# Patient Record
Sex: FEMALE | Race: ASIAN | NOT HISPANIC OR LATINO | URBAN - METROPOLITAN AREA
[De-identification: names, ages, dates, MRNs, and addresses within clinical notes are randomized per-mention and may not be internally consistent; named-entity substitution may affect disease eponyms.]

---

## 2020-11-19 ENCOUNTER — TELEPHONE ENCOUNTER (OUTPATIENT)
Dept: URBAN - METROPOLITAN AREA CLINIC 94 | Facility: CLINIC | Age: 80
End: 2020-11-19

## 2020-11-19 ENCOUNTER — WEB ENCOUNTER (OUTPATIENT)
Dept: URBAN - METROPOLITAN AREA CLINIC 94 | Facility: CLINIC | Age: 80
End: 2020-11-19

## 2020-11-19 ENCOUNTER — OFFICE VISIT (OUTPATIENT)
Dept: URBAN - METROPOLITAN AREA CLINIC 94 | Facility: CLINIC | Age: 80
End: 2020-11-19
Payer: COMMERCIAL

## 2020-11-19 VITALS
BODY MASS INDEX: 20.36 KG/M2 | DIASTOLIC BLOOD PRESSURE: 68 MMHG | WEIGHT: 97 LBS | TEMPERATURE: 98.2 F | HEIGHT: 58 IN | SYSTOLIC BLOOD PRESSURE: 95 MMHG | HEART RATE: 97 BPM

## 2020-11-19 DIAGNOSIS — K94.23 PEG TUBE MALFUNCTION: ICD-10-CM

## 2020-11-19 DIAGNOSIS — Z43.1 PEG (PERCUTANEOUS ENDOSCOPIC GASTROSTOMY) ADJUSTMENT/REPLACEMENT/REMOVAL: ICD-10-CM

## 2020-11-19 PROCEDURE — G8420 CALC BMI NORM PARAMETERS: HCPCS | Performed by: INTERNAL MEDICINE

## 2020-11-19 PROCEDURE — G9903 PT SCRN TBCO ID AS NON USER: HCPCS | Performed by: INTERNAL MEDICINE

## 2020-11-19 PROCEDURE — G8427 DOCREV CUR MEDS BY ELIG CLIN: HCPCS | Performed by: INTERNAL MEDICINE

## 2020-11-19 PROCEDURE — 99214 OFFICE O/P EST MOD 30 MIN: CPT | Performed by: INTERNAL MEDICINE

## 2020-11-19 RX ORDER — PREDNISONE 2.5 MG/1
1 MG TABLET ORAL 1
Refills: 0 | Status: ACTIVE | COMMUNITY
Start: 1900-01-01

## 2020-11-19 RX ORDER — MIRTAZAPINE 15 MG/1
TAKE 1 TABLET (15 MG) BY ORAL ROUTE ONCE DAILY BEFORE BEDTIME TABLET, FILM COATED ORAL 1
Qty: 0 | Refills: 0 | Status: ACTIVE | COMMUNITY
Start: 1900-01-01

## 2020-11-19 RX ORDER — METOPROLOL TARTRATE 75 MG/1
TAKE 1 TABLET (75 MG) BY ORAL ROUTE 2 TIMES PER DAY TABLET, FILM COATED ORAL 2
Qty: 0 | Refills: 0 | Status: ACTIVE | COMMUNITY
Start: 1900-01-01

## 2020-11-19 RX ORDER — CEPHALEXIN 250 MG/1
1 CAPSULE CAPSULE ORAL TWICE DAILY
Status: ACTIVE | COMMUNITY

## 2022-06-30 ENCOUNTER — OFFICE VISIT (OUTPATIENT)
Dept: URBAN - METROPOLITAN AREA CLINIC 94 | Facility: CLINIC | Age: 82
End: 2022-06-30
Payer: COMMERCIAL

## 2022-06-30 VITALS
WEIGHT: 110 LBS | TEMPERATURE: 97.7 F | HEIGHT: 58 IN | DIASTOLIC BLOOD PRESSURE: 52 MMHG | SYSTOLIC BLOOD PRESSURE: 88 MMHG | BODY MASS INDEX: 23.09 KG/M2 | HEART RATE: 95 BPM

## 2022-06-30 DIAGNOSIS — K59.09 CHRONIC CONSTIPATION: ICD-10-CM

## 2022-06-30 DIAGNOSIS — A49.8 CLOSTRIDIOIDES DIFFICILE INFECTION: ICD-10-CM

## 2022-06-30 PROCEDURE — 99213 OFFICE O/P EST LOW 20 MIN: CPT | Performed by: PHYSICIAN ASSISTANT

## 2022-06-30 RX ORDER — DICLOFENAC SODIUM 10 MG/G
AS DIRECTED GEL TOPICAL
Status: ACTIVE | COMMUNITY

## 2022-06-30 RX ORDER — PREDNISONE 2.5 MG/1
1 MG TABLET ORAL 1
Refills: 0 | Status: ACTIVE | COMMUNITY
Start: 1900-01-01

## 2022-06-30 RX ORDER — CEPHALEXIN 250 MG/1
1 CAPSULE CAPSULE ORAL TWICE DAILY
Status: ACTIVE | COMMUNITY

## 2022-06-30 RX ORDER — PANTOPRAZOLE SODIUM 40 MG/1
1 TABLET TABLET, DELAYED RELEASE ORAL ONCE A DAY
Status: ACTIVE | COMMUNITY

## 2022-06-30 RX ORDER — MIDODRINE HYDROCHLORIDE 2.5 MG/1
1 TABLET TABLET ORAL THREE TIMES A DAY
Status: ACTIVE | COMMUNITY

## 2022-06-30 RX ORDER — MIRTAZAPINE 15 MG/1
TAKE 1 TABLET (15 MG) BY ORAL ROUTE ONCE DAILY BEFORE BEDTIME TABLET, FILM COATED ORAL 1
Qty: 0 | Refills: 0 | Status: ACTIVE | COMMUNITY
Start: 1900-01-01

## 2022-06-30 RX ORDER — METHOTREXATE SODIUM 2.5 MG/1
AS DIRECTED TABLET ORAL
Status: ACTIVE | COMMUNITY

## 2022-06-30 RX ORDER — PREGABALIN 75 MG/1
1 CAPSULE CAPSULE ORAL TWICE A DAY
Status: ACTIVE | COMMUNITY

## 2022-06-30 RX ORDER — LEVALBUTEROL HYDROCHLORIDE 1.25 MG/3ML
3 ML AS NEEDED SOLUTION RESPIRATORY (INHALATION)
Status: ACTIVE | COMMUNITY

## 2022-06-30 RX ORDER — BUDESONIDE 0.25 MG/2ML
2 ML SUSPENSION RESPIRATORY (INHALATION) ONCE A DAY
Status: ACTIVE | COMMUNITY

## 2022-06-30 RX ORDER — HYDROXYCHLOROQUINE SULFATE 100 MG/1
AS DIRECTED TABLET ORAL
Status: ACTIVE | COMMUNITY

## 2022-06-30 RX ORDER — DILTIAZEM HYDROCHLORIDE 120 MG/1
AS DIRECTED TABLET, COATED ORAL
Status: ACTIVE | COMMUNITY

## 2022-06-30 RX ORDER — METOPROLOL TARTRATE 75 MG/1
TAKE 1 TABLET (75 MG) BY ORAL ROUTE 2 TIMES PER DAY TABLET, FILM COATED ORAL 2
Qty: 0 | Refills: 0 | Status: ACTIVE | COMMUNITY
Start: 1900-01-01

## 2022-06-30 RX ORDER — POLYETHYLENE GLYCOL 3350 17 G/17G
AS DIRECTED POWDER, FOR SOLUTION ORAL
Status: ACTIVE | COMMUNITY

## 2022-06-30 RX ORDER — TRAMADOL HYDROCHLORIDE 50 MG/1
1 TABLET AS NEEDED TABLET, FILM COATED ORAL ONCE A DAY
Status: ACTIVE | COMMUNITY

## 2022-06-30 RX ORDER — BACITRACIN ZINC, NEOMYCIN SULFATE, POLYMYXIN B SULFATE 500; 3.5; 1 [IU]/G; MG/G; [IU]/G
2 TABLETS AT BEDTIME AS NEEDED OINTMENT TOPICAL ONCE A DAY
Status: ACTIVE | COMMUNITY

## 2022-06-30 NOTE — HPI-TODAY'S VISIT:
82 y.o. female evaluated today for hx of recurrent C Diff. Pt accompanied by her daughter.   Pt recently hospitalized April 2022 for C Diff. Pt has unusual presentation of C Diff with constipation and lethargy. Recent hospital admission, ID consulted C diff pcr positive with negative toxin. Given clinical symptoms and immunosuppression ID recommended continued treatment with oral vancomycin.   CT Ventral abdominal wall hernia. Hepatic steatosis.  Left lower lobe consolidation with left pleural effusion.  Pt denies seeing ID since hospital discharge. Pt's daughter reports that patient has had C diff 3 times since last yr. Pt recently has developed similar sx when she had C Diff in April. She reports being constipated with hard stools. PT also seems very weak. She denies fever, diarrhea, melena or hematochezia. She does admit to lower abdominal cramping.

## 2022-07-04 ENCOUNTER — WEB ENCOUNTER (OUTPATIENT)
Dept: URBAN - METROPOLITAN AREA CLINIC 94 | Facility: CLINIC | Age: 82
End: 2022-07-04

## 2022-07-05 ENCOUNTER — WEB ENCOUNTER (OUTPATIENT)
Dept: URBAN - METROPOLITAN AREA CLINIC 94 | Facility: CLINIC | Age: 82
End: 2022-07-05

## 2022-08-22 ENCOUNTER — OFFICE VISIT (OUTPATIENT)
Dept: URBAN - METROPOLITAN AREA CLINIC 94 | Facility: CLINIC | Age: 82
End: 2022-08-22

## 2022-10-02 ENCOUNTER — WEB ENCOUNTER (OUTPATIENT)
Dept: URBAN - METROPOLITAN AREA CLINIC 94 | Facility: CLINIC | Age: 82
End: 2022-10-02

## 2022-10-04 ENCOUNTER — OFFICE VISIT (OUTPATIENT)
Dept: URBAN - METROPOLITAN AREA CLINIC 94 | Facility: CLINIC | Age: 82
End: 2022-10-04

## 2022-10-04 RX ORDER — BUDESONIDE 0.25 MG/2ML
2 ML SUSPENSION RESPIRATORY (INHALATION) ONCE A DAY
Status: ACTIVE | COMMUNITY

## 2022-10-04 RX ORDER — LEVALBUTEROL HYDROCHLORIDE 1.25 MG/3ML
3 ML AS NEEDED SOLUTION RESPIRATORY (INHALATION)
Status: ACTIVE | COMMUNITY

## 2022-10-04 RX ORDER — METOPROLOL TARTRATE 75 MG/1
TAKE 1 TABLET (75 MG) BY ORAL ROUTE 2 TIMES PER DAY TABLET, FILM COATED ORAL 2
Qty: 0 | Refills: 0 | Status: ACTIVE | COMMUNITY
Start: 1900-01-01

## 2022-10-04 RX ORDER — BACITRACIN ZINC, NEOMYCIN SULFATE, POLYMYXIN B SULFATE 500; 3.5; 1 [IU]/G; MG/G; [IU]/G
2 TABLETS AT BEDTIME AS NEEDED OINTMENT TOPICAL ONCE A DAY
Status: ACTIVE | COMMUNITY

## 2022-10-04 RX ORDER — MIRTAZAPINE 15 MG/1
TAKE 1 TABLET (15 MG) BY ORAL ROUTE ONCE DAILY BEFORE BEDTIME TABLET, FILM COATED ORAL 1
Qty: 0 | Refills: 0 | Status: ACTIVE | COMMUNITY
Start: 1900-01-01

## 2022-10-04 RX ORDER — POLYETHYLENE GLYCOL 3350 17 G/17G
AS DIRECTED POWDER, FOR SOLUTION ORAL
Status: ACTIVE | COMMUNITY

## 2022-10-04 RX ORDER — DICLOFENAC SODIUM 10 MG/G
AS DIRECTED GEL TOPICAL
Status: ACTIVE | COMMUNITY

## 2022-10-04 RX ORDER — CEPHALEXIN 250 MG/1
1 CAPSULE CAPSULE ORAL TWICE DAILY
Status: ACTIVE | COMMUNITY

## 2022-10-04 RX ORDER — PANTOPRAZOLE SODIUM 40 MG/1
1 TABLET TABLET, DELAYED RELEASE ORAL ONCE A DAY
Status: ACTIVE | COMMUNITY

## 2022-10-04 RX ORDER — PREDNISONE 2.5 MG/1
1 MG TABLET ORAL 1
Refills: 0 | Status: ACTIVE | COMMUNITY
Start: 1900-01-01

## 2022-10-04 RX ORDER — HYDROXYCHLOROQUINE SULFATE 100 MG/1
AS DIRECTED TABLET ORAL
Status: ACTIVE | COMMUNITY

## 2022-10-04 RX ORDER — DILTIAZEM HYDROCHLORIDE 120 MG/1
AS DIRECTED TABLET, COATED ORAL
Status: ACTIVE | COMMUNITY

## 2022-10-04 RX ORDER — MIDODRINE HYDROCHLORIDE 2.5 MG/1
1 TABLET TABLET ORAL THREE TIMES A DAY
Status: ACTIVE | COMMUNITY

## 2022-10-04 RX ORDER — TRAMADOL HYDROCHLORIDE 50 MG/1
1 TABLET AS NEEDED TABLET, FILM COATED ORAL ONCE A DAY
Status: ACTIVE | COMMUNITY

## 2022-10-04 RX ORDER — METHOTREXATE SODIUM 2.5 MG/1
AS DIRECTED TABLET ORAL
Status: ACTIVE | COMMUNITY

## 2022-10-04 RX ORDER — PREGABALIN 75 MG/1
1 CAPSULE CAPSULE ORAL TWICE A DAY
Status: ACTIVE | COMMUNITY

## 2022-12-02 ENCOUNTER — WEB ENCOUNTER (OUTPATIENT)
Dept: URBAN - METROPOLITAN AREA CLINIC 94 | Facility: CLINIC | Age: 82
End: 2022-12-02

## 2022-12-06 ENCOUNTER — OFFICE VISIT (OUTPATIENT)
Dept: URBAN - METROPOLITAN AREA CLINIC 94 | Facility: CLINIC | Age: 82
End: 2022-12-06
Payer: COMMERCIAL

## 2022-12-06 VITALS
HEIGHT: 58 IN | HEART RATE: 85 BPM | DIASTOLIC BLOOD PRESSURE: 63 MMHG | SYSTOLIC BLOOD PRESSURE: 104 MMHG | TEMPERATURE: 97.2 F

## 2022-12-06 DIAGNOSIS — Z93.1 GASTROSTOMY TUBE IN PLACE: ICD-10-CM

## 2022-12-06 DIAGNOSIS — R10.13 EPIGASTRIC PAIN: ICD-10-CM

## 2022-12-06 DIAGNOSIS — R62.7 FAILURE TO THRIVE IN ADULT: ICD-10-CM

## 2022-12-06 PROCEDURE — 99213 OFFICE O/P EST LOW 20 MIN: CPT | Performed by: PHYSICIAN ASSISTANT

## 2022-12-06 PROCEDURE — 74240 X-RAY XM UPR GI TRC 1CNTRST: CPT | Performed by: PHYSICIAN ASSISTANT

## 2022-12-06 RX ORDER — DICLOFENAC SODIUM 10 MG/G
AS DIRECTED GEL TOPICAL
Status: ACTIVE | COMMUNITY

## 2022-12-06 RX ORDER — LEVALBUTEROL HYDROCHLORIDE 1.25 MG/3ML
3 ML AS NEEDED SOLUTION RESPIRATORY (INHALATION)
Status: ACTIVE | COMMUNITY

## 2022-12-06 RX ORDER — METHOTREXATE SODIUM 2.5 MG/1
AS DIRECTED TABLET ORAL
Status: ON HOLD | COMMUNITY

## 2022-12-06 RX ORDER — POLYETHYLENE GLYCOL 3350 17 G/17G
AS DIRECTED POWDER, FOR SOLUTION ORAL
Status: ACTIVE | COMMUNITY

## 2022-12-06 RX ORDER — PANTOPRAZOLE SODIUM 40 MG/1
1 TABLET TABLET, DELAYED RELEASE ORAL ONCE A DAY
Status: ACTIVE | COMMUNITY

## 2022-12-06 RX ORDER — PREGABALIN 75 MG/1
1 CAPSULE CAPSULE ORAL TWICE A DAY
Status: ACTIVE | COMMUNITY

## 2022-12-06 RX ORDER — NUTRITIONAL SUPPLEMENT 0.04G-1/ML
AS DIRECTED LIQUID (ML) ORAL
Status: ACTIVE | COMMUNITY

## 2022-12-06 RX ORDER — MIDODRINE HYDROCHLORIDE 5 MG/1
1 TABLET TABLET ORAL THREE TIMES A DAY
Status: ACTIVE | COMMUNITY

## 2022-12-06 RX ORDER — BACITRACIN ZINC, NEOMYCIN SULFATE, POLYMYXIN B SULFATE 500; 3.5; 1 [IU]/G; MG/G; [IU]/G
2 TABLETS AT BEDTIME AS NEEDED OINTMENT TOPICAL ONCE A DAY
Status: ACTIVE | COMMUNITY

## 2022-12-06 RX ORDER — TRAMADOL HYDROCHLORIDE 50 MG/1
1 TABLET AS NEEDED TABLET, FILM COATED ORAL ONCE A DAY
Status: ACTIVE | COMMUNITY

## 2022-12-06 RX ORDER — HYDROXYCHLOROQUINE SULFATE 100 MG/1
AS DIRECTED TABLET ORAL
Status: ACTIVE | COMMUNITY

## 2022-12-06 RX ORDER — BUDESONIDE 0.25 MG/2ML
2 ML SUSPENSION RESPIRATORY (INHALATION) ONCE A DAY
Status: ACTIVE | COMMUNITY

## 2022-12-06 RX ORDER — DILTIAZEM HYDROCHLORIDE 120 MG/1
AS DIRECTED TABLET, COATED ORAL
Status: ON HOLD | COMMUNITY

## 2022-12-06 RX ORDER — PREDNISONE 2.5 MG/1
1 MG TABLET ORAL 1
Refills: 0 | Status: ACTIVE | COMMUNITY
Start: 1900-01-01

## 2022-12-06 RX ORDER — DIGOXIN 0.25 MG/ML
AS DIRECTED INJECTION INTRAMUSCULAR; INTRAVENOUS
Status: ACTIVE | COMMUNITY

## 2022-12-06 RX ORDER — MIRTAZAPINE 15 MG/1
TAKE 1 TABLET (15 MG) BY ORAL ROUTE ONCE DAILY BEFORE BEDTIME TABLET, FILM COATED ORAL 1
Qty: 0 | Refills: 0 | Status: ACTIVE | COMMUNITY
Start: 1900-01-01

## 2022-12-06 NOTE — PHYSICAL EXAM GASTROINTESTINAL
Abdomen , soft, G Tube in LUQ, nontender, nondistended , no guarding or rigidity , no masses palpable , normal bowel sounds , Liver and Spleen , no hepatomegaly present , no hepatosplenomegaly , liver nontender , spleen not palpable

## 2022-12-06 NOTE — HPI-TODAY'S VISIT:
82 y.o. female evaluated today following recent gastrosctomy tube placement in early No 2022 due to malnutrition. Pt accompanied by her son.   Pt had G Tube placed due to failure to thrive by DR Asif Romero on 11/1/2022 - reveals simone esophagus, normal duodenum, closed previous gastrostomy, an endoscopically removable PEG was placed. No specimen.    Since having tube placed, patient reports abdominal pain after feedings. They are giving her Nutren and only give 1/2 tube each time at a slow rate. Pain last about 10 mins and resolves. Denies vomiting.   Pt's daughter is also concerned regarding the length of the tube. Pt had G Tube before the outside ring was flush with the skin. Now the tubing extends further out from the ostomy and ring is not flush with extra room. They are concerned that it can come out. Otherwise, denies erythema or pain around the ostomy. Per son patient has not gained weight since having tube feeding in.

## 2022-12-08 PROBLEM — 440419004: Status: ACTIVE | Noted: 2022-12-06

## 2022-12-08 PROBLEM — 129588001: Status: ACTIVE | Noted: 2022-12-08

## 2024-01-17 ENCOUNTER — WEB ENCOUNTER (OUTPATIENT)
Dept: URBAN - METROPOLITAN AREA CLINIC 94 | Facility: CLINIC | Age: 84
End: 2024-01-17

## 2024-02-06 ENCOUNTER — OV EP (OUTPATIENT)
Dept: URBAN - METROPOLITAN AREA CLINIC 94 | Facility: CLINIC | Age: 84
End: 2024-02-06

## 2024-04-15 ENCOUNTER — OV EP (OUTPATIENT)
Dept: URBAN - METROPOLITAN AREA CLINIC 94 | Facility: CLINIC | Age: 84
End: 2024-04-15

## 2024-04-23 ENCOUNTER — OV EP (OUTPATIENT)
Dept: URBAN - METROPOLITAN AREA CLINIC 94 | Facility: CLINIC | Age: 84
End: 2024-04-23
Payer: MEDICARE

## 2024-04-23 ENCOUNTER — LAB (OUTPATIENT)
Dept: URBAN - METROPOLITAN AREA CLINIC 94 | Facility: CLINIC | Age: 84
End: 2024-04-23

## 2024-04-23 VITALS
BODY MASS INDEX: 19.31 KG/M2 | HEART RATE: 81 BPM | WEIGHT: 92 LBS | DIASTOLIC BLOOD PRESSURE: 70 MMHG | HEIGHT: 58 IN | SYSTOLIC BLOOD PRESSURE: 107 MMHG | OXYGEN SATURATION: 94 %

## 2024-04-23 DIAGNOSIS — Z93.1 GASTROSTOMY TUBE IN PLACE: ICD-10-CM

## 2024-04-23 DIAGNOSIS — R62.7 FAILURE TO THRIVE IN ADULT: ICD-10-CM

## 2024-04-23 PROCEDURE — 99213 OFFICE O/P EST LOW 20 MIN: CPT | Performed by: PHYSICIAN ASSISTANT

## 2024-04-23 RX ORDER — LEVALBUTEROL HYDROCHLORIDE 1.25 MG/3ML
3 ML AS NEEDED SOLUTION RESPIRATORY (INHALATION)
Status: ACTIVE | COMMUNITY

## 2024-04-23 RX ORDER — BUDESONIDE 0.25 MG/2ML
2 ML SUSPENSION RESPIRATORY (INHALATION) ONCE A DAY
Status: ACTIVE | COMMUNITY

## 2024-04-23 RX ORDER — NUTRITIONAL SUPPLEMENT 0.04G-1/ML
AS DIRECTED LIQUID (ML) ORAL
Status: ACTIVE | COMMUNITY

## 2024-04-23 RX ORDER — PREGABALIN 75 MG/1
1 CAPSULE CAPSULE ORAL TWICE A DAY
Status: ACTIVE | COMMUNITY

## 2024-04-23 RX ORDER — MIDODRINE HYDROCHLORIDE 5 MG/1
1 TABLET TABLET ORAL THREE TIMES A DAY
Status: ACTIVE | COMMUNITY

## 2024-04-23 RX ORDER — POLYETHYLENE GLYCOL 3350 17 G/17G
AS DIRECTED POWDER, FOR SOLUTION ORAL
Status: ACTIVE | COMMUNITY

## 2024-04-23 RX ORDER — BACITRACIN ZINC, NEOMYCIN SULFATE, POLYMYXIN B SULFATE 500; 3.5; 1 [IU]/G; MG/G; [IU]/G
2 TABLETS AT BEDTIME AS NEEDED OINTMENT TOPICAL ONCE A DAY
Status: ACTIVE | COMMUNITY

## 2024-04-23 RX ORDER — TRAMADOL HYDROCHLORIDE 50 MG/1
1 TABLET AS NEEDED TABLET, FILM COATED ORAL ONCE A DAY
Status: ACTIVE | COMMUNITY

## 2024-04-23 RX ORDER — PREDNISONE 2.5 MG/1
1 MG TABLET ORAL 1
Refills: 0 | Status: ACTIVE | COMMUNITY
Start: 1900-01-01

## 2024-04-23 RX ORDER — MIRTAZAPINE 15 MG/1
TAKE 1 TABLET (15 MG) BY ORAL ROUTE ONCE DAILY BEFORE BEDTIME TABLET, FILM COATED ORAL 1
Qty: 0 | Refills: 0 | Status: ACTIVE | COMMUNITY
Start: 1900-01-01

## 2024-04-23 RX ORDER — DIGOXIN 0.25 MG/ML
AS DIRECTED INJECTION INTRAMUSCULAR; INTRAVENOUS
Status: ACTIVE | COMMUNITY

## 2024-04-23 RX ORDER — PANTOPRAZOLE SODIUM 40 MG/1
1 TABLET TABLET, DELAYED RELEASE ORAL ONCE A DAY
Status: ACTIVE | COMMUNITY

## 2024-04-23 RX ORDER — HYDROXYCHLOROQUINE SULFATE 100 MG/1
AS DIRECTED TABLET ORAL
Status: ACTIVE | COMMUNITY

## 2024-04-23 RX ORDER — DICLOFENAC SODIUM 10 MG/G
AS DIRECTED GEL TOPICAL
Status: ACTIVE | COMMUNITY

## 2024-04-23 NOTE — HPI-TODAY'S VISIT:
83 y.o. thin female evaluated today for G Tube check.Pt accompanied by her daughter.   Pt had G Tube placed due to failure to thrive 11/1/2022 Denies having tube replaced since 2022.   Patient's daughter reports that tubing on outside of body has turned black. (mildew). Otherwise patient denies GI sx. She denies abdominal pain or bowel issues.   EGD  2022- reveals simone esophagus, normal duodenum, closed previous gastrostomy

## 2025-02-05 ENCOUNTER — WEB ENCOUNTER (OUTPATIENT)
Dept: URBAN - METROPOLITAN AREA CLINIC 94 | Facility: CLINIC | Age: 85
End: 2025-02-05

## 2025-02-06 ENCOUNTER — LAB OUTSIDE AN ENCOUNTER (OUTPATIENT)
Dept: URBAN - METROPOLITAN AREA CLINIC 94 | Facility: CLINIC | Age: 85
End: 2025-02-06

## 2025-02-06 ENCOUNTER — WEB ENCOUNTER (OUTPATIENT)
Dept: URBAN - METROPOLITAN AREA CLINIC 94 | Facility: CLINIC | Age: 85
End: 2025-02-06

## 2025-05-03 ENCOUNTER — WEB ENCOUNTER (OUTPATIENT)
Dept: URBAN - METROPOLITAN AREA CLINIC 94 | Facility: CLINIC | Age: 85
End: 2025-05-03

## 2025-05-05 ENCOUNTER — WEB ENCOUNTER (OUTPATIENT)
Dept: URBAN - METROPOLITAN AREA CLINIC 94 | Facility: CLINIC | Age: 85
End: 2025-05-05

## 2025-05-22 ENCOUNTER — OFFICE VISIT (OUTPATIENT)
Dept: URBAN - METROPOLITAN AREA CLINIC 94 | Facility: CLINIC | Age: 85
End: 2025-05-22
Payer: MEDICARE

## 2025-05-22 DIAGNOSIS — Z93.1 GASTROSTOMY TUBE IN PLACE: ICD-10-CM

## 2025-05-22 DIAGNOSIS — R62.7 FAILURE TO THRIVE IN ADULT: ICD-10-CM

## 2025-05-22 DIAGNOSIS — K59.09 CHRONIC CONSTIPATION: ICD-10-CM

## 2025-05-22 PROCEDURE — 99213 OFFICE O/P EST LOW 20 MIN: CPT | Performed by: PHYSICIAN ASSISTANT

## 2025-05-22 PROCEDURE — 99214 OFFICE O/P EST MOD 30 MIN: CPT | Performed by: PHYSICIAN ASSISTANT

## 2025-05-22 RX ORDER — PANTOPRAZOLE SODIUM 40 MG/1
1 TABLET TABLET, DELAYED RELEASE ORAL ONCE A DAY
Status: ACTIVE | COMMUNITY

## 2025-05-22 RX ORDER — HYDROXYCHLOROQUINE SULFATE 100 MG/1
AS DIRECTED TABLET ORAL
Status: ACTIVE | COMMUNITY

## 2025-05-22 RX ORDER — DIGOXIN 0.25 MG/ML
AS DIRECTED INJECTION INTRAMUSCULAR; INTRAVENOUS
Status: ACTIVE | COMMUNITY

## 2025-05-22 RX ORDER — MIRTAZAPINE 15 MG/1
TAKE 1 TABLET (15 MG) BY ORAL ROUTE ONCE DAILY BEFORE BEDTIME TABLET, FILM COATED ORAL 1
Qty: 0 | Refills: 0 | Status: ACTIVE | COMMUNITY
Start: 1900-01-01

## 2025-05-22 RX ORDER — TRAMADOL HYDROCHLORIDE 50 MG/1
1 TABLET AS NEEDED TABLET, FILM COATED ORAL ONCE A DAY
Status: ACTIVE | COMMUNITY

## 2025-05-22 RX ORDER — DICLOFENAC SODIUM 10 MG/G
AS DIRECTED GEL TOPICAL
Status: ACTIVE | COMMUNITY

## 2025-05-22 RX ORDER — POLYETHYLENE GLYCOL 3350 17 G/DOSE
1 SCOOP MIXED WITH 8 OUNCES OF FLUID POWDER (GRAM) ORAL TWICE A DAY
Qty: 180 | Refills: 3

## 2025-05-22 RX ORDER — PREDNISONE 2.5 MG/1
1 MG TABLET ORAL 1
Refills: 0 | Status: ACTIVE | COMMUNITY
Start: 1900-01-01

## 2025-05-22 RX ORDER — PREGABALIN 75 MG/1
1 CAPSULE CAPSULE ORAL TWICE A DAY
Status: ACTIVE | COMMUNITY

## 2025-05-22 RX ORDER — LEVALBUTEROL HYDROCHLORIDE 1.25 MG/3ML
3 ML AS NEEDED SOLUTION RESPIRATORY (INHALATION)
Status: ACTIVE | COMMUNITY

## 2025-05-22 RX ORDER — BUDESONIDE 0.25 MG/2ML
2 ML SUSPENSION RESPIRATORY (INHALATION) ONCE A DAY
Status: ACTIVE | COMMUNITY

## 2025-05-22 RX ORDER — PANTOPRAZOLE SODIUM 40 MG/1
1 TABLET TABLET, DELAYED RELEASE ORAL ONCE A DAY
Qty: 90 | Refills: 3

## 2025-05-22 RX ORDER — POLYETHYLENE GLYCOL 3350 17 G/DOSE
AS DIRECTED POWDER (GRAM) ORAL
Status: ACTIVE | COMMUNITY

## 2025-05-22 RX ORDER — BACITRACIN ZINC, NEOMYCIN SULFATE, POLYMYXIN B SULFATE 500; 3.5; 1 [IU]/G; MG/G; [IU]/G
2 TABLETS AT BEDTIME AS NEEDED OINTMENT TOPICAL ONCE A DAY
Status: ACTIVE | COMMUNITY

## 2025-05-22 RX ORDER — NUTRITIONAL SUPPLEMENT 0.04G-1/ML
AS DIRECTED LIQUID (ML) ORAL
Status: ACTIVE | COMMUNITY

## 2025-05-22 RX ORDER — MIDODRINE HYDROCHLORIDE 5 MG/1
1 TABLET TABLET ORAL THREE TIMES A DAY
Status: ACTIVE | COMMUNITY

## 2025-05-22 NOTE — HPI-TODAY'S VISIT:
85 y.o F evaluated via telehealth visit via patient home for G Tube check and 1 yr f/u visit .Pt accompanied by her daughter who provides history.   Pt had G Tube placed due to failure to thrive 11/1/2022 Overall doing well. Denies issues toerating formula which is [employer]. Currently, PCP is monitoring but would like to get Rx changed to GI. Pt has G Tube exchange every 6 mos at [facility] without issues. Denies any complications with tube. Denies abdominal pain. Pt's only issue is constipation. She uses Miralax daily and if still not going after a few days will use Senna. Denies SE with Senna.    EGD  2022- reveals simone esophagus, normal duodenum, closed previous gastrostomy
general

## 2025-05-26 ENCOUNTER — LAB OUTSIDE AN ENCOUNTER (OUTPATIENT)
Dept: URBAN - METROPOLITAN AREA CLINIC 94 | Facility: CLINIC | Age: 85
End: 2025-05-26

## 2025-05-26 ENCOUNTER — DASHBOARD ENCOUNTERS (OUTPATIENT)
Age: 85
End: 2025-05-26